# Patient Record
Sex: FEMALE | Race: WHITE | NOT HISPANIC OR LATINO | Employment: OTHER | URBAN - METROPOLITAN AREA
[De-identification: names, ages, dates, MRNs, and addresses within clinical notes are randomized per-mention and may not be internally consistent; named-entity substitution may affect disease eponyms.]

---

## 2017-01-16 ENCOUNTER — TELEPHONE (OUTPATIENT)
Dept: HEPATOLOGY | Facility: CLINIC | Age: 62
End: 2017-01-16

## 2017-01-16 NOTE — TELEPHONE ENCOUNTER
Reviewed u/s report 12/22/16:  Inform pt: enlarged liver , spleen, consistent with cirrhosis. No tumor in the liver.     Continue AFP and U/S abd q 6 months.

## 2017-01-26 ENCOUNTER — OFFICE VISIT (OUTPATIENT)
Dept: HEPATOLOGY | Facility: CLINIC | Age: 62
End: 2017-01-26
Payer: MEDICARE

## 2017-01-26 VITALS
SYSTOLIC BLOOD PRESSURE: 165 MMHG | RESPIRATION RATE: 18 BRPM | DIASTOLIC BLOOD PRESSURE: 65 MMHG | WEIGHT: 210 LBS | BODY MASS INDEX: 42.33 KG/M2 | HEART RATE: 79 BPM | HEIGHT: 59 IN

## 2017-01-26 DIAGNOSIS — K74.60 CIRRHOSIS OF LIVER WITH ASCITES, UNSPECIFIED HEPATIC CIRRHOSIS TYPE: Primary | ICD-10-CM

## 2017-01-26 DIAGNOSIS — R18.8 CIRRHOSIS OF LIVER WITH ASCITES, UNSPECIFIED HEPATIC CIRRHOSIS TYPE: Primary | ICD-10-CM

## 2017-01-26 DIAGNOSIS — R10.12 LEFT UPPER QUADRANT PAIN: ICD-10-CM

## 2017-01-26 PROCEDURE — 99215 OFFICE O/P EST HI 40 MIN: CPT | Mod: S$GLB,,, | Performed by: INTERNAL MEDICINE

## 2017-01-26 RX ORDER — NATEGLINIDE 120 MG/1
120 TABLET ORAL
COMMUNITY

## 2017-01-26 RX ORDER — HYDROCODONE BITARTRATE AND ACETAMINOPHEN 7.5; 325 MG/1; MG/1
1 TABLET ORAL EVERY 6 HOURS PRN
COMMUNITY

## 2017-01-26 RX ORDER — BISACODYL 5 MG
5 TABLET, DELAYED RELEASE (ENTERIC COATED) ORAL DAILY PRN
COMMUNITY

## 2017-01-26 RX ORDER — PRAVASTATIN SODIUM 40 MG/1
40 TABLET ORAL DAILY
COMMUNITY

## 2017-01-26 NOTE — PROGRESS NOTES
[facility] follow-up Note    Reason for Consult:  The primary encounter diagnosis was Cirrhosis of liver with ascites, unspecified hepatic cirrhosis type. A diagnosis of Left upper quadrant pain was also pertinent to this visit.    PCP: Primary Doctor No       HPI:  This is a 61 y.o. female here for follow-up of:  Cirrhosis    This is a 61-year-old  female with cirrhosis probably due to MENDIOLA, who is here for her routine follow-up however today she reports having left upper quadrant abdominal pain for nearly 3 months.  She was seen in the emergency room on 29 December 2016, when an abdominal CT scan was performed and that showed small amount of ascites.  Patient states that her abdominal pain has worsened since her ER visit and now is associated with marked tenderness in the left upper quadrant.  She denies fever, nausea, constipation, diarrhea, hematochezia, melena.  Her latest ultrasound of the abdomen which was performed as part of HCC surveillance on December 22, 2016 showed hepatosplenomegaly similar to prior exams in July 2016, portal vein was patent flow in the appropriate direction.  At that time no ascites was mentioned on the ultrasound.    Past history at presentation in July 2016:  This 61-year-old  female who had right upper quadrant pain for over a year, when she had an upper endoscopy to evaluate her pain and was found to have grade 1 varix on her EGD on December 16, 2015.  An ultrasound of the abdomen on January 29, 2016 showed hepatomegaly and a nodular liver consistent with cirrhosis. No focal mass was seen in the liver. A CT scan in January 2015 had shown a fatty liver, no mention was made of the size of the spleen. A 6-year-old CT from December 14, 2010 showed liver spleen pancreas and adrenal glands and kidneys were normal.    She also had anemia from frequent hemorrhoidal bleeds and at one time required a unit of packed red blood cells and iron infusions for  severe anemia.  Previously 34 years ago when she was 6 months pregnant, she had her open cholecystectomy.    Her last set of follow-up blood test from emergency room visit on 2016 continue to show anemia with a hemoglobin of 9.6 her platelet count was normal at 249 her liver chemistries were essentially normal with alkaline phosphatase of 97 AST 20 ALT 9 total bilirubin 0.3 and an albumin of 2.9 her sodium was 135, potassium 3.4 and creatinine 0.8.  Urinalysis had trace leukocyte esterase and WBCs 0-3.  Her alpha-fetoprotein was 3.    ROS:  Constitutional: No fevers, chills, weight changes, fatigue  ENT: No allergies, nosebleeds,   CV: No chest pain  Pulm: No cough, shortness of breath  Ophtho: No vision changes  GI/Liver: see HPI, abd pain as above.   Derm: No rash, itching  Heme: No swollen glands, bruising  MSK: No joint pains, joint swelling  : No dysuria, hematuria, decrease in urine output  Endo: No hot or cold intolerance  Neuro: No confusion, disorientation, difficulty with sleep, memory, concentration, syncope, seizure  Psych: No anxiety, depression    Medical History:  has a past medical history of Anemia; Cirrhosis; Colon polyp; COPD (chronic obstructive pulmonary disease); Diabetes mellitus; Diverticulosis; Gastric ulcer; Hypertension; Hypothyroid; and Sleep apnea.    Surgical History:  has a past surgical history that includes Cholecystectomy;  section; Hysterectomy; and Thyroidectomy.    Family History: family history includes Colon cancer in her sister; Colon polyps in her father; Endometrial cancer in her mother; Ovarian cancer in her mother..     Social History:  reports that she has never smoked. She does not have any smokeless tobacco history on file. She reports that she does not drink alcohol or use illicit drugs.    Review of patient's allergies indicates:   Allergen Reactions    Tylox [oxycodone-acetaminophen]        Current Outpatient Prescriptions   Medication Sig     "bisacodyl (DULCOLAX) 5 mg EC tablet Take 5 mg by mouth daily as needed for Constipation.    calcium-vitamin D3 500 mg(1,250mg) -200 unit per tablet Take 1 tablet by mouth once daily.    carvedilol (COREG) 6.25 MG tablet Take 1 tablet (6.25 mg total) by mouth 2 (two) times daily with meals.    fenofibrate (TRICOR) 145 MG tablet Take 145 mg by mouth once daily.    furosemide (LASIX) 20 MG tablet Take 1 tablet (20 mg total) by mouth once daily.    hydrocodone-acetaminophen 7.5-325mg (NORCO) 7.5-325 mg per tablet Take 1 tablet by mouth every 6 (six) hours as needed for Pain.    levothyroxine (SYNTHROID) 150 MCG tablet Take 150 mcg by mouth once daily.    nateglinide (STARLIX) 120 MG tablet Take 120 mg by mouth 3 (three) times daily before meals.    omeprazole (PRILOSEC) 40 MG capsule Take 40 mg by mouth every morning.    pravastatin (PRAVACHOL) 40 MG tablet Take 40 mg by mouth once daily.    sitagliptan-metformin (JANUMET) 50-1,000 mg per tablet Take 1 tablet by mouth 2 (two) times daily with meals.    telmisartan (MICARDIS) 80 MG Tab Take 40 mg by mouth once daily.    venlafaxine (EFFEXOR-XR) 150 MG Cp24 Take 75 mg by mouth once daily.    zolpidem (AMBIEN) 10 mg Tab Take 5 mg by mouth nightly as needed.    albuterol (PROVENTIL) 5 mg/mL nebulizer solution Take 2.5 mg by nebulization every 6 (six) hours as needed for Wheezing.    docusate sodium (COLACE) 100 MG capsule Take 100 mg by mouth daily as needed for Constipation.     No current facility-administered medications for this visit.        Objective Findings:    Vital Signs:  Visit Vitals    BP (!) 165/65    Pulse 79    Resp 18    Ht 4' 11" (1.499 m)    Wt 95.3 kg (210 lb)    BMI 42.41 kg/m2     Body mass index is 42.41 kg/(m^2).    Physical Exam:  General Appearance: Well appearing obese female in no acute distress  Head:   Normocephalic, without obvious abnormality  Eyes:    No scleral icterus, EOMI  ENT: Negative  Lungs:  no wheezes  Heart:  " Regular rate and rhythm, S1, S2 normal   Abdomen: Abdomen is distended and remarkably tender in the left upper quadrant with a palpable spleen.  There appears to be some ascites in the abdomen.  Extremities: no clubbing, cyanosis.  No edema in the lower extremities  Skin: No rash  Neurologic: CN II-XII intact, alert, oriented x 3. No asterixis      Labs:  As above    Imaging:   As above    Endoscopy:    Grade 1 varix.       Assessment:  1. Cirrhosis of liver with ascites, unspecified hepatic cirrhosis type    2. Left upper quadrant pain    Cirrhosis, most likely due to Dias, DM, poor control of blood glucose. mild portal HTN, based on grade 1 varix, normal plt ct.  splenomegaly however suggests significant portal hypertension.  CT scan of the abdomen did show some ascites which may have worsened over the last month.    At this time, patient needs evaluation in the emergency room with repeat ultrasound of the abdomen, possibly drainage of ascites and evaluate for peritonitis.  No peritonitis is found and renal function is normal she may need benefit from restarting diuretics (patient had stopped her Lasix because there was no lower extremity swelling).      Will need HCC surveillance.    Recommendations:  -  I have recommended the patient and her  go to the emergency room at the local hospital in [place] for an evaluation of left-sided abdominal pain, possibly spontaneous bacterial peritonitis.  None found and she said has significant ascites to be on diuretics as long as her renal function is normal.    For routine follow-up patient will need:  -  Labs     AFP, CMP, PT INR every 3 months,  -  Low salt in the diet, avoid canned, bottled and processed foods.  -  Continue current meds, except stop norvasc and toprol.    -  Continue carvedilol 6.25 mg BID, and Lasix 20 mg daily.   -  Patient advised to check her blood pressure daily and report if remains high.  -  Ultrasound of abdomen and AFP every 6 months,  next due July 2017   -  Avoid alcohol, smoking, sedatives and meds with codeine.  -  Endoscopy: per víctor [place]  -  Transplant option discussed, will evaluate when MELD >15.  -  Return in 6 months.       Return in about 6 months (around 7/26/2017).      Order summary:  No orders of the defined types were placed in this encounter.      Thank you so much for allowing me to participate in the care of Shelby Joshua Jordan MD

## 2017-01-26 NOTE — Clinical Note
For routine follow-up patient will need: -  Labs    AFP, CMP, PT INR every 3 months, -  Low salt in the diet, avoid canned, bottled and processed foods. -  Continue current meds, except stop norvasc and toprol.   -  Continue carvedilol 6.25 mg BID, and Lasix 20 mg daily.  -  Patient advised to check her blood pressure daily and report if remains high. -  Ultrasound of abdomen and AFP every 6 months, next due July 2017  -  Avoid alcohol, smoking, sedatives and meds with codeine. -  Endoscopy: per dr Finch [place] -  Transplant option discussed, will evaluate when MELD >15. -  Return in 6 months.

## 2017-01-26 NOTE — PATIENT INSTRUCTIONS
For routine follow-up patient will need:  -  Labs     AFP, CMP, PT INR every 3 months,  -  Low salt in the diet, avoid canned, bottled and processed foods.  -  Continue current meds, except stop norvasc and toprol.    -  Continue carvedilol 6.25 mg BID, and Lasix 20 mg daily.   -  Patient advised to check her blood pressure daily and report if remains high.  -  Ultrasound of abdomen and AFP every 6 months, next due July 2017   -  Avoid alcohol, smoking, sedatives and meds with codeine.  -  Endoscopy: per dr Finch [place]  -  Transplant option discussed, will evaluate when MELD >15.  -  Return in 6 months.

## 2017-02-22 ENCOUNTER — TELEPHONE (OUTPATIENT)
Dept: HEPATOLOGY | Facility: CLINIC | Age: 62
End: 2017-02-22

## 2017-02-22 NOTE — TELEPHONE ENCOUNTER
Received a call from the  of the patient, Eliud. Eliud stated that the patient has been in extreme pain.  Eliud asked if they had gone to see her PCP? He stated yes he had ordered something to go under her tongue (hyoscyamine) and Tramadol.  Eliud stated Dr Jordan wanted to order some test for her. Eliud informed that Shelby needs to be examined by a Dr if she is in extreme pain.  You need to take her to the nearest ER.   Eliud stated that the last time she saw Dr Jordan in [place] she sent her to [facility] ER. We have been to the ER 3 times.  Eliud instructed to take her to [facility] ER or come to [facility] ER. She needs to be checked.   I will send the message to Dr Jordan.

## 2017-04-05 ENCOUNTER — TELEPHONE (OUTPATIENT)
Dept: HEPATOLOGY | Facility: CLINIC | Age: 62
End: 2017-04-05

## 2017-04-05 NOTE — TELEPHONE ENCOUNTER
Spoke with pt's , he stated his wife is still having left abdominal pain. I asked if wife was seen PCP, he stated yes. Wife is going back for additional (x-ray). Mr. Lewis still requesting an appointment to see Dr. Jordan. Next available is 05/11/2017 @ 2pm. He accepted appt. Explained to  follow up with PCP or ER if symptoms  get worse. He verbalized understanding.    Lab Orders mailed to get done in April.     Told Nurse Jaida about patient,  Since last ABD U/S was in 12/2016 she suggest patient get scan done May before appt. Order mailed.

## 2017-05-04 LAB
AGE: 61
ALBUMIN: 3.1 (ref 3.4–4.8)
ALP SERPL-CCNC: 40 U/L (ref 25–125)
ALT SERPL W P-5'-P-CCNC: 9 U/L (ref 0–55)
ANION GAP SERPL CALC-SCNC: 9 MMOL/L (ref ?–30)
AST SERPL-CCNC: 20 U/L (ref 5–34)
BASOPHILS ABSOLUTE COUNT: 0 /ΜL
BASOPHILS NFR BLD: 0 % (ref 0–3)
BILIRUB SERPL-MCNC: 0.4 MG/DL (ref 0.1–1.4)
BUN BLD-MCNC: 8 MG/DL (ref 10–21)
CALCIUM SERPL-MCNC: 9.4 MG/DL (ref 8.4–10.7)
CHLORIDE SERPL-SCNC: 106 MMOL/L (ref 99–108)
CO2 SERPL-SCNC: 26 MMOL/L (ref 13–22)
CREAT SERPL-MCNC: 0.8 MG/DL (ref 0.5–1.1)
EGFR IF AFRICAN AMERICAN: 60
EOSINOPHIL NFR BLD: 2.1 %
EOSINOPHILS ABSOLUTE COUNT: 0 /ΜL
ERYTHROCYTE [DISTWIDTH] IN BLOOD BY AUTOMATED COUNT: 17.8 % (ref 11.5–14.5)
EST. GFR  (NON AFRICAN AMERICAN): 60 MG/DL
GFR MDRD AF AMER: 83
GFR: 69
GLUCOSE SERPL-MCNC: 140 MG/DL (ref 80–115)
HCT VFR BLD AUTO: 29 % (ref 33–46)
HGB BLD-MCNC: 9.2 G/DL (ref 11–15.7)
INR PPP: 1.1 (ref 0.9–1.2)
LYMPHOCYTES %: 15 % (ref 18–52)
LYMPHOCYTES ABSOLUTE COUNT: 1 /ΜL
MCH RBC QN AUTO: 22.9 PG (ref 26–34)
MCHC RBC AUTO-ENTMCNC: 32 G/DL (ref 32–35)
MCV RBC AUTO: 73 FL (ref 82–108)
MONOCYTES %: 5.8
MONOCYTES ABSOLUTE COUNT: 0.5
NEUTROPHILS ABSOLUTE COUNT: 6 /ΜL
NEUTROPHILS RELATIVE PERCENT: 77 % (ref 46–78)
PLATELET # BLD AUTO: 240 K/ΜL (ref 150–399)
POTASSIUM SERPL-SCNC: 3.1 MMOL/L (ref 3.4–5.3)
PROTHROMBIN TIME, POC: 12.9 (ref 9.8–13.2)
RBC # BLD AUTO: 3.99 10^6/ΜL (ref 3.8–5.4)
SODIUM BLD-SCNC: 141 MMOL/L (ref 137–147)
TOTAL PROTEIN: 8.1 G/DL (ref 6.4–8.2)
WBC # BLD AUTO: 8.1 10^3/ML (ref 4.1–10.6)

## 2017-05-05 ENCOUNTER — TELEPHONE (OUTPATIENT)
Dept: HEPATOLOGY | Facility: CLINIC | Age: 62
End: 2017-05-05

## 2017-05-05 DIAGNOSIS — E87.6 HYPOKALEMIA: Primary | ICD-10-CM

## 2017-05-05 NOTE — TELEPHONE ENCOUNTER
----- Message from Mary Jordan MD sent at 5/5/2017  1:15 PM CDT -----  Pl have patient increase foods/drinks with potassium, such as orange juice, oranges, potatoes, bananas.  She needs to have another BMP done in a week. Order placed.

## 2017-05-05 NOTE — TELEPHONE ENCOUNTER
MA tried to call patient she is unable to reached left her VM that we are going to mail her blood work order and she need to get this done Monday. ELÍAS

## 2017-05-08 LAB — AFP-TUMOR MARKER: 2

## 2017-05-09 ENCOUNTER — TELEPHONE (OUTPATIENT)
Dept: HEPATOLOGY | Facility: CLINIC | Age: 62
End: 2017-05-09

## 2017-05-09 NOTE — TELEPHONE ENCOUNTER
----- Message from Mary Jordan MD sent at 5/8/2017  9:50 PM CDT -----  Tumor marker normal, inform pt  .thx

## 2017-05-11 ENCOUNTER — OFFICE VISIT (OUTPATIENT)
Dept: HEPATOLOGY | Facility: CLINIC | Age: 62
End: 2017-05-11
Payer: MEDICARE

## 2017-05-11 VITALS
HEIGHT: 59 IN | WEIGHT: 185 LBS | SYSTOLIC BLOOD PRESSURE: 167 MMHG | DIASTOLIC BLOOD PRESSURE: 87 MMHG | HEART RATE: 76 BPM | BODY MASS INDEX: 37.29 KG/M2

## 2017-05-11 DIAGNOSIS — K74.60 CIRRHOSIS OF LIVER WITHOUT ASCITES, UNSPECIFIED HEPATIC CIRRHOSIS TYPE: Primary | ICD-10-CM

## 2017-05-11 PROCEDURE — 99214 OFFICE O/P EST MOD 30 MIN: CPT | Mod: S$GLB,,, | Performed by: INTERNAL MEDICINE

## 2017-05-11 RX ORDER — TOPIRAMATE SPINKLE 25 MG/1
50 CAPSULE ORAL 2 TIMES DAILY
COMMUNITY

## 2017-05-11 NOTE — Clinical Note
Plan: -  Labs    AFP, CMP, PT INR every 6 months, next in Novemebr 2017, -  Low salt in the diet, avoid canned, bottled and processed foods. -  Continue current meds, except stop norvasc and toprol.   -  Continue carvedilol 6.25 mg BID, and Lasix 20 mg daily.  -  Patient advised to check her blood pressure daily and report if remains high. -  Ultrasound of abdomen and AFP every 6 months, next due November 2017  -  Avoid alcohol, smoking, sedatives and meds with codeine. -  Endoscopy: per dr Finch [place] -  Transplant option discussed, will evaluate when MELD >15. -  Return in 6 months.

## 2017-05-11 NOTE — PATIENT INSTRUCTIONS
Plan:  -  Labs     AFP, CMP, PT INR every 6 months, next in Novemebr 2017,  -  Low salt in the diet, avoid canned, bottled and processed foods.  -  Continue current meds, except stop norvasc and toprol.    -  Continue carvedilol 6.25 mg BID, and Lasix 20 mg daily.   -  Patient advised to check her blood pressure daily and report if remains high.  -  Ultrasound of abdomen and AFP every 6 months, next due November 2017   -  Avoid alcohol, smoking, sedatives and meds with codeine.  -  Endoscopy: per dr Finch [place]  -  Transplant option discussed, will evaluate when MELD >15.  -  Return in 6 months.

## 2017-05-11 NOTE — PROGRESS NOTES
[facility] follow-up Note    Reason for Consult:  The encounter diagnosis was Cirrhosis of liver without ascites, unspecified hepatic cirrhosis type.    PCP: Primary Doctor No       HPI:  This is a 61 y.o. female here for follow-up of:  Cirrhosis    This is a 61-year-old  female with cirrhosis probably due to MENDIOLA, who is here for her routine follow-up however today she reports having left upper quadrant abdominal pain for nearly 3 months.  She was seen in the emergency room on 29 December 2016, when an abdominal CT scan was performed and that showed small amount of ascites.  Patient states that her abdominal pain has worsened since her ER visit and now is associated with marked tenderness in the left upper quadrant.  She denies fever, nausea, constipation, diarrhea, hematochezia, melena.  Her latest ultrasound of the abdomen which was performed as part of HCC surveillance on December 22, 2016 showed hepatosplenomegaly similar to prior exams in July 2016, portal vein was patent flow in the appropriate direction.  At that time no ascites was mentioned on the ultrasound.    Past history at presentation in July 2016:  This 61-year-old  female who had right upper quadrant pain for over a year, when she had an upper endoscopy to evaluate her pain and was found to have grade 1 varix on her EGD on December 16, 2015.  An ultrasound of the abdomen on January 29, 2016 showed hepatomegaly and a nodular liver consistent with cirrhosis. No focal mass was seen in the liver. A CT scan in January 2015 had shown a fatty liver, no mention was made of the size of the spleen. A 6-year-old CT from December 14, 2010 showed liver spleen pancreas and adrenal glands and kidneys were normal.    She also had anemia from frequent hemorrhoidal bleeds and at one time required a unit of packed red blood cells and iron infusions for severe anemia.  Previously 34 years ago when she was 6 months pregnant, she had  her open cholecystectomy.    Her last set of follow-up blood test from emergency room visit on 2016 continue to show anemia with a hemoglobin of 9.6 her platelet count was normal at 249 her liver chemistries were essentially normal with alkaline phosphatase of 97 AST 20 ALT 9 total bilirubin 0.3 and an albumin of 2.9 her sodium was 135, potassium 3.4 and creatinine 0.8.  Urinalysis had trace leukocyte esterase and WBCs 0-3.  Her alpha-fetoprotein was 3.    Since her last visit, had frequent falls (6 time in one week), saw pain doctor, Dr. Bonner, who sent her to Dr Bonner, she was diagnosed with polyneuropathy, was given Topiramate 50 mg TID.  This has stopped falling, lost 20 lb since last visit.  Continues to lose wt.     Labs from 17 are ok, except for anemia. MELD 7.    HCC surveillance: on 17, AFP 2, and u/s 17 showed HSmegaly, no focal lesion in the liver or ascites.      ROS:  Constitutional: No fevers, chills, weight changes, fatigue  ENT: No allergies, nosebleeds,   CV: No chest pain  Pulm: No cough, shortness of breath  Ophtho: No vision changes  GI/Liver: see HPI, abd pain as above.   Derm: No rash, itching  Heme: No swollen glands, bruising  MSK: No joint pains, joint swelling  : No dysuria, hematuria, decrease in urine output  Endo: No hot or cold intolerance  Neuro: No confusion, disorientation, difficulty with sleep, memory, concentration, syncope, seizure  Psych: No anxiety, depression    Medical History:  has a past medical history of Anemia; Cirrhosis; Colon polyp; COPD (chronic obstructive pulmonary disease); Diabetes mellitus; Diverticulosis; Gastric ulcer; Hypertension; Hypothyroid; and Sleep apnea.    Surgical History:  has a past surgical history that includes Cholecystectomy;  section; Hysterectomy; and Thyroidectomy.    Family History: family history includes Colon cancer in her sister; Colon polyps in her father; Endometrial cancer in her mother; Ovarian  cancer in her mother..     Social History:  reports that she has never smoked. She does not have any smokeless tobacco history on file. She reports that she does not drink alcohol or use illicit drugs.    Review of patient's allergies indicates:   Allergen Reactions    Tylox [oxycodone-acetaminophen]        Current Outpatient Prescriptions   Medication Sig    topiramate 25 mg capsule Take 50 mg by mouth 2 (two) times daily.    albuterol (PROVENTIL) 5 mg/mL nebulizer solution Take 2.5 mg by nebulization every 6 (six) hours as needed for Wheezing.    bisacodyl (DULCOLAX) 5 mg EC tablet Take 5 mg by mouth daily as needed for Constipation.    calcium-vitamin D3 500 mg(1,250mg) -200 unit per tablet Take 1 tablet by mouth once daily.    carvedilol (COREG) 6.25 MG tablet Take 1 tablet (6.25 mg total) by mouth 2 (two) times daily with meals.    docusate sodium (COLACE) 100 MG capsule Take 100 mg by mouth daily as needed for Constipation.    fenofibrate (TRICOR) 145 MG tablet Take 145 mg by mouth once daily.    furosemide (LASIX) 20 MG tablet Take 1 tablet (20 mg total) by mouth once daily.    hydrocodone-acetaminophen 7.5-325mg (NORCO) 7.5-325 mg per tablet Take 1 tablet by mouth every 6 (six) hours as needed for Pain.    levothyroxine (SYNTHROID) 150 MCG tablet Take 150 mcg by mouth once daily.    nateglinide (STARLIX) 120 MG tablet Take 120 mg by mouth 3 (three) times daily before meals.    omeprazole (PRILOSEC) 40 MG capsule Take 40 mg by mouth every morning.    pravastatin (PRAVACHOL) 40 MG tablet Take 40 mg by mouth once daily.    sitagliptan-metformin (JANUMET) 50-1,000 mg per tablet Take 1 tablet by mouth 2 (two) times daily with meals.    telmisartan (MICARDIS) 80 MG Tab Take 40 mg by mouth once daily.    venlafaxine (EFFEXOR-XR) 150 MG Cp24 Take 75 mg by mouth once daily.    zolpidem (AMBIEN) 10 mg Tab Take 5 mg by mouth nightly as needed.     No current facility-administered medications for this  "visit.        Objective Findings:    Vital Signs:  BP (!) 167/87  Pulse 76  Ht 4' 11" (1.499 m)  Wt 83.9 kg (185 lb)  BMI 37.37 kg/m2  Body mass index is 37.37 kg/(m^2).    Physical Exam:  General Appearance: Well appearing obese female in no acute distress  Head:   Normocephalic, without obvious abnormality  Eyes:    No scleral icterus, EOMI  ENT: Negative  Lungs:  no wheezes  Heart:  Regular rate and rhythm, S1, S2 normal   Abdomen: Abdomen is distended and remarkably tender in the left upper quadrant with a palpable spleen.  There appears to be some ascites in the abdomen.  Extremities: no clubbing, cyanosis.  No edema in the lower extremities  Skin: No rash  Neurologic: CN II-XII intact, alert, oriented x 3. No asterixis      Labs:  As above    Imaging:   As above    Endoscopy:    Grade 1 varix.       Assessment:  1. Cirrhosis of liver without ascites, unspecified hepatic cirrhosis type    Cirrhosis, most likely due to Dias, DM, poor control of blood glucose. mild portal HTN, based on grade 1 varix, normal plt ct.  splenomegaly however suggests significant portal hypertension.  CT scan of the abdomen did show some ascites which may have worsened over the last month.    At this time, patient needs evaluation in the emergency room with repeat ultrasound of the abdomen, possibly drainage of ascites and evaluate for peritonitis.  No peritonitis is found and renal function is normal she may need benefit from restarting diuretics (patient had stopped her Lasix because there was no lower extremity swelling).      Will need HCC surveillance.    Plan:  -  Labs     AFP, CMP, PT INR every 6 months, next in Novemebr 2017,  -  On topiramate for poluneuropathy  -  Low salt in the diet, avoid canned, bottled and processed foods.  -  Continue current meds, except stop norvasc and toprol.    -  Continue carvedilol 6.25 mg BID, and Lasix 20 mg daily.   -  Patient advised to check her blood pressure daily and report if remains " high.  -  Ultrasound of abdomen and AFP every 6 months, next due November 2017   -  Avoid alcohol, smoking, sedatives and meds with codeine.  -  Endoscopy: per víctor Stone [place]  -  Transplant option discussed, will evaluate when MELD >15.  -  Return in 6 months.       Return in about 6 months (around 11/11/2017).      Order summary:  No orders of the defined types were placed in this encounter.      Thank you so much for allowing me to participate in the care of Shelby Jordan MD

## 2017-08-14 DIAGNOSIS — K74.60 CIRRHOSIS OF LIVER WITHOUT ASCITES, UNSPECIFIED HEPATIC CIRRHOSIS TYPE: ICD-10-CM

## 2017-08-14 DIAGNOSIS — K76.6 PORTAL HYPERTENSION: ICD-10-CM

## 2017-08-15 RX ORDER — CARVEDILOL 6.25 MG/1
TABLET ORAL
Qty: 60 TABLET | Refills: 11 | Status: SHIPPED | OUTPATIENT
Start: 2017-08-15

## 2018-01-10 LAB
ALBUMIN: 3.2
ALP ISOS SERPL LEV INH-CCNC: 98 U/L
ALT SERPL-CCNC: 21 U/L
ANION GAP SERPL CALC-SCNC: 9 MMOL/L
AST SERPL-CCNC: 34 U/L
BASOPHILS ABSOLUTE COUNT: 0 /ΜL
BASOPHILS NFR BLD: 1 % (ref 0–3)
BILIRUBIN, TOTAL: 0.4
BUN BLD-MCNC: 9 MG/DL (ref 4–21)
CALCIUM SERPL-MCNC: 7.7 MG/DL
CARBON DIOXIDE, CO2: 26
CHLORIDE: 104
CREAT SERPL-MCNC: 0.8 MG/DL
EGFR IF AFRICAN AMERICAN: >60
EOSINOPHILS ABSOLUTE COUNT: 0 /ΜL
ERYTHROCYTE [DISTWIDTH] IN BLOOD BY AUTOMATED COUNT: 20.2 % (ref 11.5–14.5)
EST. GFR  (NON AFRICAN AMERICAN): NORMAL MG/DL
GFR MDRD AF AMER: 94
GFR MDRD NON AF AMER: 78
GLUCOSE: 142
HCT VFR BLD AUTO: 35 % (ref 36–46)
HGB BLD-MCNC: 10.8 G/DL (ref 12–16)
IMMUNOGLOBULIN G: 0.06
INR PPP: 1.1
LYMPH%: 15 % (ref 18–52)
LYMPHOCYTES ABSOLUTE COUNT: 1 /ΜL
MCH RBC QN AUTO: 27 PG
MCHC RBC AUTO-ENTMCNC: 30.5 G/DL
MCV RBC AUTO: 88.5 FL
MONOCYTES %: 6.5
MONOCYTES ABSOLUTE COUNT: 0.47
NEUTROPHILS ABSOLUTE COUNT: 5 /ΜL
NEUTROPHILS RELATIVE PERCENT: 74 % (ref 46–78)
PLATELET # BLD AUTO: 171 K/ΜL (ref 150–399)
POTASSIUM: 3.37
PROT SERPL-MCNC: 8.4 G/DL
PT: 12.7
RBC # BLD AUTO: 4 10^6/ΜL (ref 4–5.2)
SISOMICIN TITR SBT: 3 % (ref 0–6)
SODIUM: 139
WBC # BLD AUTO: 7.2 10^3/ML

## 2018-01-11 ENCOUNTER — TELEPHONE (OUTPATIENT)
Dept: HEPATOLOGY | Facility: CLINIC | Age: 63
End: 2018-01-11

## 2018-01-11 NOTE — TELEPHONE ENCOUNTER
----- Message from Mary Jordan MD sent at 1/10/2018  9:23 PM CST -----  Labs are consistent with cirrhosis.

## 2018-01-16 ENCOUNTER — TELEPHONE (OUTPATIENT)
Dept: HEPATOLOGY | Facility: CLINIC | Age: 63
End: 2018-01-16

## 2018-01-16 LAB — AFP: 2

## 2018-01-16 NOTE — TELEPHONE ENCOUNTER
----- Message from Mary Jordan MD sent at 1/16/2018  3:27 PM CST -----  Pl.  inform pt:  Liver cancer marker is normal.

## 2018-02-05 ENCOUNTER — TELEPHONE (OUTPATIENT)
Dept: HEPATOLOGY | Facility: CLINIC | Age: 63
End: 2018-02-05

## 2018-02-05 NOTE — TELEPHONE ENCOUNTER
Reviewed outside u/s abd 1/9/18:  Hepmegaly, nodular suggests cirrhosis. Marked splenomeg.      Inform pt:  Cirrhosis seen on u/s, do HCC surveiallance if not already started..  Send orders for u/s and AFP q 6 months. Next in July 2018

## 2018-02-06 NOTE — TELEPHONE ENCOUNTER
Mailed results note below and [facility]. Made recall in the system also for reminder. ELÍAS

## 2018-05-16 ENCOUNTER — TELEPHONE (OUTPATIENT)
Dept: HEPATOLOGY | Facility: CLINIC | Age: 63
End: 2018-05-16

## 2018-05-16 NOTE — TELEPHONE ENCOUNTER
----- Message from Neema Wang sent at 5/16/2018  3:06 PM CDT -----  Contact: pt      ----- Message -----  From: Janette Rodriguez  Sent: 5/16/2018   9:22 AM  To: Hepatology Scheduling    Patient Requesting Sooner Appointment.     Reason: (I.e. Caller declined first available, appointment listed below. Caller will not accept being placed on the waitlist and is requesting a message be sent to doctor, etc.)    Name of Caller: pt  When is the first available appointment?  n/a  Symptoms:  n/a  Communication Preference: [phone]  Additional Information: pt needs to schedule appt with Dr. Jordan in [place], [place] in July.

## 2018-05-16 NOTE — TELEPHONE ENCOUNTER
MA attempted to call patient back she is unable to reached left her VM to please give us a callback.

## 2018-06-01 ENCOUNTER — TELEPHONE (OUTPATIENT)
Dept: HEPATOLOGY | Facility: CLINIC | Age: 63
End: 2018-06-01

## 2018-06-01 NOTE — TELEPHONE ENCOUNTER
----- Message from Ozzie Sahu sent at 6/1/2018  9:59 AM CDT -----  Contact: patient   Patient received a letter to schedule an appointment for Reza Lim          Please call [phone]      Thanks!

## 2018-06-06 ENCOUNTER — TELEPHONE (OUTPATIENT)
Dept: HEPATOLOGY | Facility: CLINIC | Age: 63
End: 2018-06-06

## 2018-06-06 NOTE — TELEPHONE ENCOUNTER
MA called patient back, schedule her follow up visit in [place] on 7/12/18 she will do her labs and US before the visit. Patient stated that she already have her paper order for the test. Mailed appt reminder to patient.

## 2018-06-06 NOTE — TELEPHONE ENCOUNTER
----- Message from Mary Vazquez sent at 6/4/2018 11:00 AM CDT -----  Contact: pt  Patient Requesting Sooner Appointment.     Reason for sooner appt.:  When is the first available appointment?  Communication Preference: [phone]  Additional Information:wants to sched appt in July w/Dr. Jordan

## 2018-06-29 LAB
AGE: 62
AGE: 62
ALBUMIN: 2.9
ALBUMIN: 2.9
ALP SERPL-CCNC: 89 U/L
ALP SERPL-CCNC: 89 U/L (ref 25–125)
ALT SERPL W P-5'-P-CCNC: 11 U/L (ref 7–35)
ALT SERPL W P-5'-P-CCNC: 11 U/L (ref 7–35)
ANION GAP SERPL CALC-SCNC: 10 MMOL/L (ref 5–13)
ANION GAP SERPL CALC-SCNC: 10 MMOL/L (ref ?–30)
AST SERPL-CCNC: 24 U/L (ref 13–35)
AST SERPL-CCNC: 24 U/L (ref 13–35)
BASOPHILS NFR BLD: 0 % (ref 0–1)
BASOPHILS NFR BLD: 0.02 % (ref 0.01–0.08)
BUN BLD-MCNC: 9 MG/DL (ref 10–20)
BUN/CREAT SERPL: 8.5
CALCIUM SERPL-MCNC: 8.9 MG/DL
CALCIUM SERPL-MCNC: 8.9 MG/DL (ref 8.7–10.7)
CHLORIDE SERPL-SCNC: 107 MMOL/L (ref 98–107)
CHLORIDE SERPL-SCNC: 107 MMOL/L (ref 99–108)
CO2 SERPL-SCNC: 25 MMOL/L (ref 13–22)
CO2 SERPL-SCNC: 25 MMOL/L (ref 22–32)
CREAT SERPL-MCNC: 0.8 MG/DL (ref 0.5–1.1)
CREAT SERPL-MCNC: 0.8 MG/DL (ref 0.5–1.1)
EGFR IF AFRICAN AMERICAN: 60
EOSINOPHIL NFR BLD: 0.24 % (ref 0.04–0.36)
EOSINOPHIL NFR BLD: 3 % (ref 0.7–5.8)
ERYTHROCYTE [DISTWIDTH] IN BLOOD BY AUTOMATED COUNT: 14.8 % (ref 11.7–14.4)
EST. GFR  (NON AFRICAN AMERICAN): 60 MG/DL
EST. GFR  (NON AFRICAN AMERICAN): 60 MG/DL
GFR MDRD AF AMER: 85
GFR MDRD NON AF AMER: 85
GFR: 70
GFR: 70
GLUCOSE SERPL-MCNC: 152 MG/DL
GLUCOSE SERPL-MCNC: 152 MG/DL (ref 80–115)
HCT VFR BLD AUTO: 30 % (ref 34–45)
HGB BLD-MCNC: 8.7 G/DL (ref 11.2–15.7)
IGM: 0.9 (ref 0–0.4)
INR PPP: 1.1 (ref 0.9–1.1)
LYMPH #: 0.96 (ref 1.18–3.74)
LYMPH%: 12 % (ref 19–52)
Lab: 44.3 (ref 36.4–46.3)
Lab: 60
MCH RBC QN AUTO: 24 PG (ref 25.6–32.2)
MCHC RBC AUTO-ENTMCNC: 29 G/DL (ref 32–36)
MCV RBC AUTO: 83.7 FL (ref 79.4–94.8)
MONO #: 0.51 (ref 0.24–0.86)
MONO%: 6 % (ref 5–13)
NEUTROPHILS NFR BLD: 6.11 % (ref 1.56–6.13)
NEUTROPHILS NFR BLD: 77.3 % (ref 34–71.1)
NUCLEATED RED BLOOD CELLS: 0 /100 (ref 0–0)
PLATELET # BLD AUTO: 174 K/ΜL (ref 140–369)
POTASSIUM SERPL-SCNC: 3.3 MMOL/L (ref 3.3–5.1)
POTASSIUM SERPL-SCNC: 3.3 MMOL/L (ref 3.4–5.3)
PT: 13.1
RBC # BLD AUTO: 3.63 10*6/UL (ref 3.93–5.22)
SODIUM BLD-SCNC: 142 MMOL/L (ref 136–145)
SODIUM BLD-SCNC: 142 MMOL/L (ref 137–147)
TOTAL BILIRUBIN POC: 0.2
TOTAL BILIRUBIN POC: 0.2
TOTAL PROTEIN: 7.8 G/DL (ref 6.4–8.2)
TOTAL PROTEIN: 7.8 G/DL (ref 6.4–8.2)
WBC # BLD: 7.91 10*3/UL (ref 3.98–10.04)

## 2018-06-30 LAB
INR PPP: 0 (ref 2–3)
INR PPP: 1.1 (ref 0.9–1.1)
PT: 13.1

## 2018-07-02 ENCOUNTER — TELEPHONE (OUTPATIENT)
Dept: HEPATOLOGY | Facility: CLINIC | Age: 63
End: 2018-07-02

## 2018-07-02 NOTE — TELEPHONE ENCOUNTER
----- Message from Mary Jordan MD sent at 6/29/2018  5:57 PM CDT -----  Anemia, needs to see PCP.  Otherwise ok blood counts.

## 2018-07-03 ENCOUNTER — TELEPHONE (OUTPATIENT)
Dept: HEPATOLOGY | Facility: CLINIC | Age: 63
End: 2018-07-03

## 2018-07-03 NOTE — TELEPHONE ENCOUNTER
----- Message from Mary Jordan MD sent at 7/2/2018  9:54 PM CDT -----  Potassium little low, drink orange juice, eat bananas.

## 2018-07-03 NOTE — TELEPHONE ENCOUNTER
----- Message from Mary Jordan MD sent at 7/2/2018  9:47 PM CDT -----  Liver chemistries are OK.  Pl inform pt.

## 2018-07-12 ENCOUNTER — OFFICE VISIT (OUTPATIENT)
Dept: HEPATOLOGY | Facility: CLINIC | Age: 63
End: 2018-07-12
Payer: MEDICARE

## 2018-07-12 VITALS
BODY MASS INDEX: 39.92 KG/M2 | WEIGHT: 198 LBS | SYSTOLIC BLOOD PRESSURE: 161 MMHG | RESPIRATION RATE: 20 BRPM | DIASTOLIC BLOOD PRESSURE: 81 MMHG | HEART RATE: 76 BPM | HEIGHT: 59 IN

## 2018-07-12 DIAGNOSIS — K74.69 OTHER CIRRHOSIS OF LIVER: Primary | ICD-10-CM

## 2018-07-12 PROCEDURE — 99215 OFFICE O/P EST HI 40 MIN: CPT | Mod: S$GLB,,, | Performed by: INTERNAL MEDICINE

## 2018-07-12 RX ORDER — TRAMADOL HYDROCHLORIDE 100 MG/1
100 TABLET, EXTENDED RELEASE ORAL DAILY
COMMUNITY

## 2018-07-12 RX ORDER — LOSARTAN POTASSIUM 100 MG/1
100 TABLET ORAL DAILY
COMMUNITY

## 2018-07-12 RX ORDER — GABAPENTIN 100 MG/1
100 CAPSULE ORAL 3 TIMES DAILY
COMMUNITY

## 2018-07-12 RX ORDER — GLIMEPIRIDE 4 MG/1
4 TABLET ORAL
COMMUNITY

## 2018-07-12 NOTE — Clinical Note
Plan: -  Labs    AFP, CMP, PT INR every 6 months, next in Jan 2019, -  On topiramate for poluneuropathy -  Low salt in the diet, avoid canned, bottled and processed foods. -  Continue current meds, except stop norvasc and toprol.   -  Continue carvedilol 6.25 mg BID, and Lasix 20 mg daily.  -  Patient advised to check her blood pressure daily and report if remains high. -  Ultrasound of abdomen and AFP every 6 months, next due Jan 2019. -  Avoid alcohol, smoking, sedatives and meds with codeine. -  Endoscopy: per dr iFnch [place] -  Transplant option discussed, will evaluate when MELD >15. -  Return in 6 months.

## 2018-07-12 NOTE — PROGRESS NOTES
[facility] follow-up Note    Reason for Consult:  The encounter diagnosis was Other cirrhosis of liver.    PCP: Primary Doctor No       HPI:  This is a 62 y.o. female here for follow-up of:  Cirrhosis    This is a 61-year-old  female with cirrhosis probably due to MENDIOLA, who is here for her routine follow-up.  At her last visit, she had abd pain, with ascites form holding diuretics.  She was sent to the ER.  Currently she is on lasix 20 daily.       Patient denies fever, nausea, constipation, diarrhea, hematochezia, melena.      HCC surveillance:   Her latest ultrasound of the abdomen which was performed as part of HCC surveillance on 6/29/18 showed heterogenous liver parenchyma, nodular contour, similar to prior exams, portal vein was patent flow in the appropriate direction.  At that time no ascites was mentioned on the ultrasound. Last AFP was 2, on 6/29/18.      Past history at presentation in July 2016:  This 61-year-old  female who had right upper quadrant pain for over a year, when she had an upper endoscopy to evaluate her pain and was found to have grade 1 varix on her EGD on December 16, 2015.  An ultrasound of the abdomen on January 29, 2016 showed hepatomegaly and a nodular liver consistent with cirrhosis. No focal mass was seen in the liver. A CT scan in January 2015 had shown a fatty liver, no mention was made of the size of the spleen. A 6-year-old CT from December 14, 2010 showed liver spleen pancreas and adrenal glands and kidneys were normal.    She also had anemia from frequent hemorrhoidal bleeds and at one time required a unit of packed red blood cells and iron infusions for severe anemia.  Previously 34 years ago when she was 6 months pregnant, she had her open cholecystectomy.    Her last set of follow-up blood test from emergency room visit on December 29, 2016 continue to show anemia with a hemoglobin of 9.6 her platelet count was normal at 249 her liver  chemistries were essentially normal with alkaline phosphatase of 97 AST 20 ALT 9 total bilirubin 0.3 and an albumin of 2.9 her sodium was 135, potassium 3.4 and creatinine 0.8.  Urinalysis had trace leukocyte esterase and WBCs 0-3.  Her alpha-fetoprotein was 3.    Since her last visit, had frequent falls (6 time in one week), saw pain doctor, Dr. Bonner, who sent her to Dr Bonner, she was diagnosed with polyneuropathy, was given Topiramate 50 mg TID.  This has stopped falling, lost 20 lb since last visit.  Continues to lose wt.     Labs from 17 are ok, except for anemia. MELD 7.    HCC surveillance: on 17, AFP 2, and u/s 17 showed HSmegaly, no focal lesion in the liver or ascites.      ROS:  Constitutional: No fevers, chills, weight changes, fatigue  ENT: No allergies, nosebleeds,   CV: No chest pain  Pulm: No cough, shortness of breath  Ophtho: No vision changes  GI/Liver: see HPI, no abd pain, does report abd is swollen, but she holds the abd wall fat in her hands and claims that is the swelling she is referring to as swelling.    Derm: No rash, itching  Heme: No swollen glands, bruising  MSK: No joint pains, joint swelling  : No dysuria, hematuria, decrease in urine output  Endo: No hot or cold intolerance  Neuro: No confusion, disorientation, difficulty with sleep, memory, concentration, syncope, seizure  Psych: No anxiety, depression    Medical History:  has a past medical history of Anemia; Cirrhosis; Colon polyp; COPD (chronic obstructive pulmonary disease); Diabetes mellitus; Diverticulosis; Gastric ulcer; Hypertension; Hypothyroid; and Sleep apnea.    Surgical History:  has a past surgical history that includes Cholecystectomy;  section; Hysterectomy; and Thyroidectomy.    Family History: family history includes Colon cancer in her sister; Colon polyps in her father; Endometrial cancer in her mother; Ovarian cancer in her mother..     Social History:  reports that she has never smoked.  She does not have any smokeless tobacco history on file. She reports that she does not drink alcohol or use drugs.    Review of patient's allergies indicates:   Allergen Reactions    Tylox [oxycodone-acetaminophen]        Current Outpatient Prescriptions   Medication Sig    albuterol (PROVENTIL) 5 mg/mL nebulizer solution Take 2.5 mg by nebulization every 6 (six) hours as needed for Wheezing.    bisacodyl (DULCOLAX) 5 mg EC tablet Take 5 mg by mouth daily as needed for Constipation.    calcium-vitamin D3 500 mg(1,250mg) -200 unit per tablet Take 1 tablet by mouth once daily.    carvedilol (COREG) 6.25 MG tablet TAKE ONE TABLET BY MOUTH TWICE DAILY WITH MEALS    docusate sodium (COLACE) 100 MG capsule Take 100 mg by mouth daily as needed for Constipation.    gabapentin (NEURONTIN) 100 MG capsule Take 100 mg by mouth 3 (three) times daily.    glimepiride (AMARYL) 4 MG tablet Take 4 mg by mouth before breakfast.    levothyroxine (SYNTHROID) 150 MCG tablet Take 150 mcg by mouth once daily.    losartan (COZAAR) 100 MG tablet Take 100 mg by mouth once daily.    omeprazole (PRILOSEC) 40 MG capsule Take 40 mg by mouth every morning.    pravastatin (PRAVACHOL) 40 MG tablet Take 40 mg by mouth once daily.    sitagliptan-metformin (JANUMET) 50-1,000 mg per tablet Take 1 tablet by mouth 2 (two) times daily with meals.    topiramate 25 mg capsule Take 50 mg by mouth 2 (two) times daily.    traMADol (ULTRAM-ER) 100 MG Tb24 Take 100 mg by mouth once daily.    venlafaxine (EFFEXOR-XR) 150 MG Cp24 Take 75 mg by mouth once daily.    zolpidem (AMBIEN) 10 mg Tab Take 5 mg by mouth nightly as needed.    fenofibrate (TRICOR) 145 MG tablet Take 145 mg by mouth once daily.    furosemide (LASIX) 20 MG tablet Take 1 tablet (20 mg total) by mouth once daily.    hydrocodone-acetaminophen 7.5-325mg (NORCO) 7.5-325 mg per tablet Take 1 tablet by mouth every 6 (six) hours as needed for Pain.    nateglinide (STARLIX) 120 MG  "tablet Take 120 mg by mouth 3 (three) times daily before meals.    telmisartan (MICARDIS) 80 MG Tab Take 40 mg by mouth once daily.     No current facility-administered medications for this visit.        Objective Findings:    Vital Signs:  BP (!) 161/81   Pulse 76   Resp 20   Ht 4' 11" (1.499 m)   Wt 89.8 kg (198 lb)   BMI 39.99 kg/m²   Body mass index is 39.99 kg/m².    Physical Exam:  General Appearance: Well appearing obese female in no acute distress  Head:   Normocephalic, without obvious abnormality  Eyes:    No scleral icterus, EOMI  ENT: Negative  Lungs:  no wheezes  Heart:  Regular rate and rhythm, S1, S2 normal   Abdomen: Abdomen is not distended and non-tender.    Extremities: no clubbing, cyanosis.  No edema in the lower extremities  Skin: No rash  Neurologic: CN II-XII intact, alert, oriented x 3. No asterixis      Labs:  As above    Imaging:   As above    Endoscopy:    Grade 1 varix.       Assessment:  1. Other cirrhosis of liver    Cirrhosis, most likely due to MENDIOLA, DM, poor control of blood glucose. mild portal HTN, based on grade 1 varix, normal plt ct.  splenomegaly however suggests significant portal hypertension.  CT scan of the abdomen did show some ascites in the past, but last u/s on 6/29/18 does not mention any perihepatic ascites.    HCC surveillance neg to date 6/29/18 with u/s and AFP showing no evid of focal liver lesion or elev of AFP. .    Plan:  -  Labs     AFP, CMP, PT INR every 6 months, next in Jan 2019,  -  On topiramate for poluneuropathy  -  Low salt in the diet, avoid canned, bottled and processed foods.  -  Continue current meds, except stop norvasc and toprol.    -  Continue carvedilol 6.25 mg BID, and Lasix 20 mg daily.   -  Patient advised to check her blood pressure daily and report if remains high.  -  Ultrasound of abdomen and AFP every 6 months, next due Jan 2019.  -  Avoid alcohol, smoking, sedatives and meds with codeine.  -  Endoscopy: per dr Finch, new " [place]  -  Transplant option discussed, will evaluate when MELD >15.  -  Return in 6 months.       Follow-up in about 6 months (around 1/12/2019).      Order summary:  No orders of the defined types were placed in this encounter.      Thank you so much for allowing me to participate in the care of Shelby Jordan MD

## 2018-07-12 NOTE — PATIENT INSTRUCTIONS
Plan:  -  Labs     AFP, CMP, PT INR every 6 months, next in Jan 2019,  -  On topiramate for poluneuropathy  -  Low salt in the diet, avoid canned, bottled and processed foods.  -  Continue current meds, except stop norvasc and toprol.    -  Continue carvedilol 6.25 mg BID, and Lasix 20 mg daily.   -  Patient advised to check her blood pressure daily and report if remains high.  -  Ultrasound of abdomen and AFP every 6 months, next due Jan 2019.  -  Avoid alcohol, smoking, sedatives and meds with codeine.  -  Endoscopy: per dr Finch [place]  -  Transplant option discussed, will evaluate when MELD >15.  -  Return in 6 months.

## 2018-07-15 ENCOUNTER — TELEPHONE (OUTPATIENT)
Dept: HEPATOLOGY | Facility: CLINIC | Age: 63
End: 2018-07-15

## 2019-03-04 LAB
AFP-TUMOR MARKER: 2
AGE: 63
ALBUMIN: 3.1
ALK PHOS ISOENZYNMES: 80
ALT SERPL-CCNC: 15 U/L
ANION GAP SERPL CALC-SCNC: 7 MMOL/L
AST SERPL-CCNC: 23 U/L
BASOPHILS NFR BLD: 0.04 %
BASOPHILS NFR BLD: 1 % (ref 0–3)
BUN BLD-MCNC: 7 MG/DL (ref 4–21)
CALCIUM SERPL-MCNC: 8 MG/DL
CHLORIDE: 108
CO2 SERPL-SCNC: 24 MMOL/L
CREAT SERPL-MCNC: 0.8 MG/DL
EGFR IF AFRICAN AMERICAN: >60
EOSINOPHIL NFR BLD: 0.23 %
EOSINOPHIL NFR BLD: 3 %
ERYTHROCYTE [DISTWIDTH] IN BLOOD BY AUTOMATED COUNT: 18.6 %
ERYTHROCYTE [DISTWIDTH] IN BLOOD BY AUTOMATED COUNT: 61.1 %
EST. GFR  (NON AFRICAN AMERICAN): 60 MG/DL
GFR MDRD AF AMER: 86
GFR: 71
GLUCOSE: 189
HCT VFR BLD AUTO: 36 % (ref 36–46)
HGB BLD-MCNC: 11.4 G/DL (ref 12–16)
IMMATURE GRANULOCYTES: 0.06
IMMATURE GRANULOCYTES: 0.8
INR PPP: 1.1 (ref 2–3)
LYMPH #: 1.2
LYMPH%: 16 % (ref 18–52)
MCH RBC QN AUTO: 28.4 PG
MCHC RBC AUTO-ENTMCNC: 31.8 G/DL
MCV RBC AUTO: 89.5 FL
MONO #: 0.46
MONO%: 6 % (ref 3–10)
NEUTROPHILS NFR BLD: 5.66 %
NEUTROPHILS RELATIVE PERCENT: 74 % (ref 46–78)
NRBC: 0
NRBC: 0
PLATELETS: 141
POTASSIUM: 4.01
PT: 12.4
RBC # BLD AUTO: 4.01 10*6/UL
SODIUM: 139
TOTAL BILIRUBIN POC: 0.3
TOTAL PROTEIN: 7.9 G/DL (ref 6.4–8.2)
WBC: 7.65

## 2019-03-06 ENCOUNTER — TELEPHONE (OUTPATIENT)
Dept: HEPATOLOGY | Facility: CLINIC | Age: 64
End: 2019-03-06

## 2019-03-06 NOTE — TELEPHONE ENCOUNTER
----- Message from Mary Jordan MD sent at 3/5/2019  4:02 PM CST -----  Please inform patient results are OK.

## 2019-04-10 ENCOUNTER — TELEPHONE (OUTPATIENT)
Dept: HEPATOLOGY | Facility: CLINIC | Age: 64
End: 2019-04-10

## 2019-04-10 NOTE — TELEPHONE ENCOUNTER
MA called patient to inform her that MD is sick she is not able to make the appt in [place] tomorrow 4/11. We have reschedule her appt 5/23.     She is unable to reached left her DETAILED VM about the change on her schedule.     Mailed new appt reminder to patient.

## 2019-04-19 ENCOUNTER — TELEPHONE (OUTPATIENT)
Dept: HEPATOLOGY | Facility: CLINIC | Age: 64
End: 2019-04-19

## 2019-05-23 ENCOUNTER — OFFICE VISIT (OUTPATIENT)
Dept: HEPATOLOGY | Facility: CLINIC | Age: 64
End: 2019-05-23
Payer: MEDICARE

## 2019-05-23 VITALS
DIASTOLIC BLOOD PRESSURE: 81 MMHG | HEART RATE: 70 BPM | RESPIRATION RATE: 20 BRPM | HEIGHT: 59 IN | WEIGHT: 206 LBS | BODY MASS INDEX: 41.53 KG/M2 | SYSTOLIC BLOOD PRESSURE: 155 MMHG

## 2019-05-23 DIAGNOSIS — K74.69 OTHER CIRRHOSIS OF LIVER: Primary | ICD-10-CM

## 2019-05-23 PROCEDURE — 99214 PR OFFICE/OUTPT VISIT, EST, LEVL IV, 30-39 MIN: ICD-10-PCS | Mod: S$GLB,,, | Performed by: INTERNAL MEDICINE

## 2019-05-23 PROCEDURE — 99214 OFFICE O/P EST MOD 30 MIN: CPT | Mod: S$GLB,,, | Performed by: INTERNAL MEDICINE

## 2019-05-23 NOTE — Clinical Note
Plan:-  Patient advised to see Dr. Micah Finch for symptoms of chronic nausea, bilateral flank pains.  -  Labs   AFP, CMP, PT INR every 6 months, next in Aug  2019,-  Low salt in the diet, avoid canned, bottled and processed foods-  Continue carvedilol 6.25 mg BID, and Lasix 20 mg daily. -  Patient advised to check her blood pressure daily and report if remains high.-  Ultrasound of abdomen and AFP every 6 months, next due Aug 2019.-  Avoid alcohol, smoking, sedatives and meds with codeine.-  Endoscopy: per dr Finch [place]-  Transplant option discussed, will evaluate when MELD >15.-  Return in 1 year.

## 2019-05-23 NOTE — PATIENT INSTRUCTIONS
Plan:  -  Patient advised to see Dr. Micah Finch for symptoms of chronic nausea, bilateral flank pains.    -  Labs     AFP, CMP, PT INR every 6 months, next in Aug  2019,  -  Low salt in the diet, avoid canned, bottled and processed foods  -  Continue carvedilol 6.25 mg BID, and Lasix 20 mg daily.   -  Patient advised to check her blood pressure daily and report if remains high.  -  Ultrasound of abdomen and AFP every 6 months, next due Aug 2019.  -  Avoid alcohol, smoking, sedatives and meds with codeine.  -  Endoscopy: per dr Finch [place]  -  Transplant option discussed, will evaluate when MELD >15.  -  Return in 1 year.

## 2019-05-23 NOTE — PROGRESS NOTES
[facility] follow-up Note    Reason for Visit:  The encounter diagnosis was Other cirrhosis of liver.    PCP: Primary Doctor No       HPI:  This is a 63 y.o. female here for follow-up of:  Cirrhosis    This is a 61-year-old  female with cirrhosis probably due to MENDIOLA, who is here for her routine follow-up.  At her last visit, she had abd pain, with ascites form holding diuretics.  She was sent to the ER.  Currently she is on lasix 20 daily.      Patient reports chronic nausea x 3 months, bilat flak pain.  She has seen Dr. Micah Finch previously, he had recommended colonoscopy, which she dies not want to go through.       Patient denies fever, constipation, diarrhea, hematochezia, melena.      HCC surveillance:   Her latest ultrasound of the abdomen which was performed as part of HCC surveillance on 2/14/2019 showed cirrhosis, no focal lesion in the liver.  portal vein was patent flow in the appropriate direction.  At that time no ascites was mentioned on the ultrasound.     Last AFP was 2, on 2/14/19.      Past history at presentation in July 2016:  grade 1 varix on her EGD on December 16, 2015.      She also had anemia from frequent hemorrhoidal bleeds and at one time required a unit of packed red blood cells and iron infusions for severe anemia.  Previously 34 years ago when she was 6 months pregnant, she had her open cholecystectomy.      ROS:  Constitutional: No fevers, chills, weight changes, fatigue  ENT: No allergies, nosebleeds,   CV: No chest pain  Pulm: No cough, shortness of breath  Ophtho: No vision changes  GI/Liver: see HPI, bilat flank pain, does not report abd is swollen,     Derm: No rash, itching  Heme: No swollen glands, bruising  MSK: No joint pains, joint swelling  : No dysuria, hematuria, decrease in urine output  Endo: No hot or cold intolerance  Neuro: No confusion, disorientation, difficulty with sleep, memory, concentration, syncope, seizure  Psych: No anxiety,  depression    Medical History:  has a past medical history of Anemia, Cirrhosis, Colon polyp, COPD (chronic obstructive pulmonary disease), Diabetes mellitus, Diverticulosis, Gastric ulcer, Hypertension, Hypothyroid, and Sleep apnea.    Surgical History:  has a past surgical history that includes Cholecystectomy;  section; Hysterectomy; and Thyroidectomy.    Family History: family history includes Colon cancer in her sister; Colon polyps in her father; Endometrial cancer in her mother; Ovarian cancer in her mother..     Social History:  reports that she has never smoked. She does not have any smokeless tobacco history on file. She reports that she does not drink alcohol or use drugs.    Review of patient's allergies indicates:   Allergen Reactions    Tylox [oxycodone-acetaminophen]        Current Outpatient Medications   Medication Sig    albuterol (PROVENTIL) 5 mg/mL nebulizer solution Take 2.5 mg by nebulization every 6 (six) hours as needed for Wheezing.    bisacodyl (DULCOLAX) 5 mg EC tablet Take 5 mg by mouth daily as needed for Constipation.    calcium-vitamin D3 500 mg(1,250mg) -200 unit per tablet Take 1 tablet by mouth once daily.    carvedilol (COREG) 6.25 MG tablet TAKE ONE TABLET BY MOUTH TWICE DAILY WITH MEALS    docusate sodium (COLACE) 100 MG capsule Take 100 mg by mouth daily as needed for Constipation.    fenofibrate (TRICOR) 145 MG tablet Take 145 mg by mouth once daily.    furosemide (LASIX) 20 MG tablet Take 1 tablet (20 mg total) by mouth once daily.    gabapentin (NEURONTIN) 100 MG capsule Take 100 mg by mouth 3 (three) times daily.    glimepiride (AMARYL) 4 MG tablet Take 4 mg by mouth before breakfast.    hydrocodone-acetaminophen 7.5-325mg (NORCO) 7.5-325 mg per tablet Take 1 tablet by mouth every 6 (six) hours as needed for Pain.    levothyroxine (SYNTHROID) 150 MCG tablet Take 150 mcg by mouth once daily.    losartan (COZAAR) 100 MG tablet Take 100 mg by mouth once  "daily.    nateglinide (STARLIX) 120 MG tablet Take 120 mg by mouth 3 (three) times daily before meals.    omeprazole (PRILOSEC) 40 MG capsule Take 40 mg by mouth every morning.    pravastatin (PRAVACHOL) 40 MG tablet Take 40 mg by mouth once daily.    sitagliptan-metformin (JANUMET) 50-1,000 mg per tablet Take 1 tablet by mouth 2 (two) times daily with meals.    telmisartan (MICARDIS) 80 MG Tab Take 40 mg by mouth once daily.    topiramate 25 mg capsule Take 50 mg by mouth 2 (two) times daily.    traMADol (ULTRAM-ER) 100 MG Tb24 Take 100 mg by mouth once daily.    venlafaxine (EFFEXOR-XR) 150 MG Cp24 Take 75 mg by mouth once daily.    zolpidem (AMBIEN) 10 mg Tab Take 5 mg by mouth nightly as needed.     No current facility-administered medications for this visit.        Objective Findings:    Vital Signs:  BP (!) 155/81   Pulse 70   Resp 20   Ht 4' 11" (1.499 m)   Wt 93.4 kg (206 lb)   BMI 41.61 kg/m²   Body mass index is 41.61 kg/m².    Physical Exam:  General Appearance: Well appearing obese female in no acute distress  Head:   Normocephalic, without obvious abnormality  Eyes:    No scleral icterus, EOMI  ENT: Negative  Lungs:  no wheezes  Heart:  Regular rate and rhythm, S1, S2 normal   Abdomen: Abdomen is not distended and non-tender.    Extremities: no clubbing, cyanosis.  No edema in the lower extremities  Skin: No rash  Neurologic: CN II-XII intact, alert, oriented x 3. No asterixis      Labs:  As above    Imaging:   As above    Endoscopy:    Grade 1 varix.       Assessment:  1. Other cirrhosis of liver    Cirrhosis, most likely due to MENDIOLA, DM, poor control of blood glucose. mild portal HTN, based on grade 1 varix, normal plt ct.  splenomegaly however suggests significant portal hypertension.  CT scan of the abdomen did show some ascites in the past, but last u/s on 6/29/18 does not mention any perihepatic ascites.    HCC surveillance neg to date as of 2/14/19 with u/s and AFP showing no evid " of focal liver lesion or elev of AFP. .    Plan:  -  Patient advised to see Dr. Micah Finch for symptoms of chronic nausea, bilateral flank pains.    -  Labs     AFP, CMP, PT INR every 6 months, next in Aug  2019,  -  Low salt in the diet, avoid canned, bottled and processed foods  -  Continue carvedilol 6.25 mg BID, and Lasix 20 mg daily.   -  Patient advised to check her blood pressure daily and report if remains high.  -  Ultrasound of abdomen and AFP every 6 months, next due Aug 2019.  -  Avoid alcohol, smoking, sedatives and meds with codeine.  -  Endoscopy: per víctor [place]  -  Transplant option discussed, will evaluate when MELD >15.  -  Return in 1 year.       Follow up in about 1 year (around 5/23/2020).      Order summary:  No orders of the defined types were placed in this encounter.      Thank you so much for allowing me to participate in the care of Shelby Jordan MD

## 2020-01-31 ENCOUNTER — TELEPHONE (OUTPATIENT)
Dept: GASTROENTEROLOGY | Facility: CLINIC | Age: 65
End: 2020-01-31

## 2020-01-31 NOTE — TELEPHONE ENCOUNTER
----- Message from Jaida Feliciano sent at 1/31/2020 11:06 AM CST -----  Contact: referring office nurse  Please call above patient at [phone] need to schedule appointment faxing over information from referring doctors office thanks.

## 2020-02-19 ENCOUNTER — TELEPHONE (OUTPATIENT)
Dept: HEPATOLOGY | Facility: CLINIC | Age: 65
End: 2020-02-19

## 2020-02-19 NOTE — TELEPHONE ENCOUNTER
----- Message from Shayy Luther sent at 2/19/2020 11:13 AM CST -----  Contact: Pt [phone]  Pt needs orders for blood work and ultrasound   Fax to [facility] [phone] [place]

## 2020-02-28 ENCOUNTER — TELEPHONE (OUTPATIENT)
Dept: GASTROENTEROLOGY | Facility: CLINIC | Age: 65
End: 2020-02-28

## 2020-02-28 NOTE — TELEPHONE ENCOUNTER
MA contact pt to informed pt appt with Dr. Landers on 4/30 will be cancel left detail message to call to  r/s appt

## 2020-03-02 ENCOUNTER — TELEPHONE (OUTPATIENT)
Dept: GASTROENTEROLOGY | Facility: CLINIC | Age: 65
End: 2020-03-02

## 2020-03-02 NOTE — TELEPHONE ENCOUNTER
MA contact patient to inform pt appt 4/30 was r/s to 4/16 at 1 pm with Dr. Ramesh instead of Dr. Landers due to Dr. Landers being out on 4/30    Pt didn't answer left detail message to return call

## 2020-03-02 NOTE — TELEPHONE ENCOUNTER
----- Message from Bridget Benavidez sent at 3/2/2020  3:08 PM CST -----  Contact: pt [phone]  Pt states that 04/16 appt is not going to work for her and she needs other date.Please call

## 2020-03-03 ENCOUNTER — TELEPHONE (OUTPATIENT)
Dept: GASTROENTEROLOGY | Facility: CLINIC | Age: 65
End: 2020-03-03

## 2020-03-03 NOTE — TELEPHONE ENCOUNTER
MA informed pt that Dr. Landers won't be in clinic on 4/30 and appt was r/s to 4/16 instead. Offered pt several other appts with Dr. Landers . Pt denied appt besides 4/24/20 at 1:30 pm with Dr. Landers ([email or web address])slot informed pt this appt will be schedule on 4/17 for 4/24.    Pt verbalize understanding, confirmed appt and thank MA     Message routed to Margo

## 2020-03-03 NOTE — TELEPHONE ENCOUNTER
----- Message from Fang Emerson MA sent at 3/3/2020 10:13 AM CST -----  Contact: [phone]  Good morning,    I tried to contact pt and LM. So pt wants to see Dr. Landers. Does he have anything next month around the 16th?    ----- Message -----  From: Brenda Puente MA  Sent: 3/3/2020  10:05 AM CST  To: [facility] Staff    Pt received an appt slip by mail for an office visit on 4/16/202 that does not work for her. Also pt wants to know why is she not seeing  Dr Landers?    Please call to reschedule.     NO ACCESS    [phone] please call this number NOT her husbands number (her phone number hs been updated in Epic)

## 2020-03-05 ENCOUNTER — TELEPHONE (OUTPATIENT)
Dept: HEPATOLOGY | Facility: CLINIC | Age: 65
End: 2020-03-05

## 2020-03-05 LAB
AFP - TUMOR MARKER: 2
ALBUMIN: 3.2
ALP ISOS SERPL LEV INH-CCNC: 103 U/L
ALT: 13
ANION GAP SERPL CALC-SCNC: 6 MMOL/L
AST: 24
BILIRUBIN, TOTAL: 0.4
BUN BLD-MCNC: 9 MG/DL (ref 4–21)
CALCIUM SERPL-MCNC: 7.6 MG/DL
CARBON DIOXIDE, CO2: 24
CHLORIDE: 110
CREAT SERPL-MCNC: 0.8 MG/DL
EGFR IF AFRICAN AMERICAN: NORMAL
EST. GFR  (NON AFRICAN AMERICAN): NORMAL
GFR MDRD AF AMER: 86
GFR: 71
GLUCOSE: 134
HCT: 35.2
HGB: 10.8
INR PPP: 1.1 (ref 2–3)
MCH RBC QN AUTO: 27.8 PG
MCHC RBC AUTO-ENTMCNC: 30.7 G/DL
MCV RBC AUTO: 90.7 FL
PLATELET # BLD AUTO: 151 K/?L (ref 150–399)
POTASSIUM: 3.96
PROTEIN TOTAL: 7.7
PT: 12.4
RBC # BLD AUTO: 3.88 10*6/UL
RDW-CV: 15.9
RDW-SD: 52.4
SODIUM: 140
WBC: 7.06

## 2020-03-05 NOTE — TELEPHONE ENCOUNTER
----- Message from Mary Jordan MD sent at 3/5/2020  3:58 PM CST -----  Please inform patient results are OK.

## 2020-03-20 ENCOUNTER — TELEPHONE (OUTPATIENT)
Dept: GASTROENTEROLOGY | Facility: CLINIC | Age: 65
End: 2020-03-20

## 2020-03-20 NOTE — TELEPHONE ENCOUNTER
----- Message from Evelin Sanchez sent at 3/20/2020 11:33 AM CDT -----  Contact: pt: [phone]  Pt is returning a call from the clinic re appt       Please contact pt: [phone]

## 2020-03-20 NOTE — TELEPHONE ENCOUNTER
Spoke with pt regarding 04/16/2020 appointment. Pt stated she already cancelled it and spoke with Coby about scheduling a new appt on 04/24. I informed pt if anything changes before then she would be contacted. Pt verbalized understanding.

## 2020-03-30 ENCOUNTER — TELEPHONE (OUTPATIENT)
Dept: HEPATOLOGY | Facility: CLINIC | Age: 65
End: 2020-03-30

## 2020-03-30 NOTE — TELEPHONE ENCOUNTER
----- Message from Vane Armstrong sent at 3/30/2020 10:47 AM CDT -----  Contact: pt  Patient calling to schedule appt       Call Back : [phone]

## 2020-03-30 NOTE — TELEPHONE ENCOUNTER
MA called patient back, she want to make her appt in [place]. She accepted 5/28 mailed appt reminder to patient.

## 2020-04-17 ENCOUNTER — TELEPHONE (OUTPATIENT)
Dept: GASTROENTEROLOGY | Facility: CLINIC | Age: 65
End: 2020-04-17

## 2020-04-17 NOTE — TELEPHONE ENCOUNTER
MA contact pt to discuss appt with Dr. Landers on 4/24 appt will schedule as VV. Pt didn't answer left detail message to return call.

## 2020-04-27 ENCOUNTER — TELEPHONE (OUTPATIENT)
Dept: GASTROENTEROLOGY | Facility: CLINIC | Age: 65
End: 2020-04-27

## 2020-04-27 ENCOUNTER — OFFICE VISIT (OUTPATIENT)
Dept: GASTROENTEROLOGY | Facility: CLINIC | Age: 65
End: 2020-04-27
Payer: MEDICARE

## 2020-04-27 DIAGNOSIS — D64.9 ANEMIA, UNSPECIFIED TYPE: Primary | ICD-10-CM

## 2020-04-27 DIAGNOSIS — Z86.010 HISTORY OF COLON POLYPS: ICD-10-CM

## 2020-04-27 DIAGNOSIS — I85.00 VARICES OF ESOPHAGUS DETERMINED BY ENDOSCOPY: ICD-10-CM

## 2020-04-27 DIAGNOSIS — K74.60 HEPATIC CIRRHOSIS, UNSPECIFIED HEPATIC CIRRHOSIS TYPE, UNSPECIFIED WHETHER ASCITES PRESENT: ICD-10-CM

## 2020-04-27 PROCEDURE — 99204 PR OFFICE/OUTPT VISIT, NEW, LEVL IV, 45-59 MIN: ICD-10-PCS | Mod: S$PBB,GC,,

## 2020-04-27 PROCEDURE — 99204 OFFICE O/P NEW MOD 45 MIN: CPT | Mod: S$PBB,GC,,

## 2020-04-27 NOTE — TELEPHONE ENCOUNTER
MA contact pt regarding message below. Pt didn't answer left detail message to return call.     ----- Message from Evelin Sanchez sent at 4/27/2020  9:53 AM CDT -----  Contact: pt: [phone]  Pt is returning a call to the clinic       Please contact pt: [phone]

## 2020-04-27 NOTE — PROGRESS NOTES
[facility]  Audio Only Telehealth Visit     The patient location is: Home  The chief complaint leading to consultation is: anemia  Visit type: Virtual visit with audio only (telephone)     The reason for the audio only service rather than synchronous audio and video virtual visit was related to technical difficulties or patient preference/necessity.     Each patient to whom I provide medical services by telemedicine is:  (1) informed of the relationship between the physician and patient and the respective role of any other health care provider with respect to management of the patient; and (2) notified that they may decline to receive medical services by telemedicine and may withdraw from such care at any time. Patient verbally consented to receive this service via voice-only telephone call.      Reason for visit: Anemia  Referring Provider/PCP: Jae Cordova MD    History of Present Illness:  Shelby Lewis is a 64 y.o. female with a history of Cirrhosis (suspect MENDIOLA), small EV (on EGD in 2016 on BB ppx),  who is presenting for initial evaluation of anemia. This is a telephone visit.     Patient has had chronic anemia for many years, follows Hematology, has been receiving IV iron infusions q6 months. Patient was sent for GI evaluation of anemia. She has intermittent nausea at time when she eats, no vomiting, no dysphagia, no hematemesis, no black stools or BRBPR, denies weight loss. She has family history of colon cancer in her sister at age 63. Her last EGD was in 7/28/2016 which showed Grade I EV and 3 gastric ulcers in antrum. She was recommended PPI twice daily. Colonoscopy in 2016 showed two AVMS in cecum, one AVM in ascending colon, one polyp 1.5 cm removed, one polyp 0.7 cm removed, and one polyp 5 mm removed, and rectal varices were seen. . She takes omeprazole 40mg daily. She was recommended to go for a colonoscopy in the past but she did not follow up with it. Her most recent  labs show hb of 10.8 on 3/2/20. No iron studies done in the past.         Review of Systems:   Constitutional: no fever, chills or change in weight   Eyes: no visual changes   ENT: no sore throat or dysphagia  Respiratory: no cough or shortness of breath   Cardiovascular: no chest pain or palpitations   Gastrointestinal: as per HPI  Hematologic/Lymphatic: no easy bruising or lymphadenopathy   Musculoskeletal: no arthralgias or myalgias   Neurological: no change in mental status  Behavioral/Psych: no change in mood    Medical History:  Past Medical History:   Diagnosis Date    Anemia     Anxiety     Arthritis     Cirrhosis     Colon polyp     tubular adenoma    COPD (chronic obstructive pulmonary disease)     Depression     Diabetes mellitus     Diverticulosis     Gastric ulcer     Heart disease     Hypertension     Hypothyroid     Sleep apnea        Past Surgical History:   Procedure Laterality Date    CERVICAL SPINE SURGERY       SECTION      CHOLECYSTECTOMY      HYSTERECTOMY      THYROIDECTOMY         Family History   Problem Relation Age of Onset    Endometrial cancer Mother     Ovarian cancer Mother     Colon polyps Father     Colon cancer Sister        Social History     Socioeconomic History    Marital status:      Spouse name: Not on file    Number of children: Not on file    Years of education: Not on file    Highest education level: Not on file   Occupational History    Not on file   Social Needs    Financial resource strain: Not on file    Food insecurity:     Worry: Not on file     Inability: Not on file    Transportation needs:     Medical: Not on file     Non-medical: Not on file   Tobacco Use    Smoking status: Never Smoker   Substance and Sexual Activity    Alcohol use: No    Drug use: No    Sexual activity: Not on file   Lifestyle    Physical activity:     Days per week: Not on file     Minutes per session: Not on file    Stress: Not on file    Relationships    Social connections:     Talks on phone: Not on file     Gets together: Not on file     Attends Religion service: Not on file     Active member of club or organization: Not on file     Attends meetings of clubs or organizations: Not on file     Relationship status: Not on file   Other Topics Concern    Not on file   Social History Narrative    Not on file       Current Outpatient Medications on File Prior to Visit   Medication Sig Dispense Refill    albuterol (PROVENTIL) 5 mg/mL nebulizer solution Take 2.5 mg by nebulization every 6 (six) hours as needed for Wheezing.      bisacodyl (DULCOLAX) 5 mg EC tablet Take 5 mg by mouth daily as needed for Constipation.      calcium-vitamin D3 500 mg(1,250mg) -200 unit per tablet Take 1 tablet by mouth once daily.      carvedilol (COREG) 6.25 MG tablet TAKE ONE TABLET BY MOUTH TWICE DAILY WITH MEALS 60 tablet 11    docusate sodium (COLACE) 100 MG capsule Take 100 mg by mouth daily as needed for Constipation.      fenofibrate (TRICOR) 145 MG tablet Take 145 mg by mouth once daily.      furosemide (LASIX) 20 MG tablet Take 1 tablet (20 mg total) by mouth once daily. 30 tablet 11    gabapentin (NEURONTIN) 100 MG capsule Take 100 mg by mouth 3 (three) times daily.      glimepiride (AMARYL) 4 MG tablet Take 4 mg by mouth before breakfast.      hydrocodone-acetaminophen 7.5-325mg (NORCO) 7.5-325 mg per tablet Take 1 tablet by mouth every 6 (six) hours as needed for Pain.      levothyroxine (SYNTHROID) 150 MCG tablet Take 150 mcg by mouth once daily.      losartan (COZAAR) 100 MG tablet Take 100 mg by mouth once daily.      nateglinide (STARLIX) 120 MG tablet Take 120 mg by mouth 3 (three) times daily before meals.      omeprazole (PRILOSEC) 40 MG capsule Take 40 mg by mouth every morning.      pravastatin (PRAVACHOL) 40 MG tablet Take 40 mg by mouth once daily.      sitagliptan-metformin (JANUMET) 50-1,000 mg per tablet Take 1 tablet by mouth 2  (two) times daily with meals.      telmisartan (MICARDIS) 80 MG Tab Take 40 mg by mouth once daily.      topiramate 25 mg capsule Take 50 mg by mouth 2 (two) times daily.      traMADol (ULTRAM-ER) 100 MG Tb24 Take 100 mg by mouth once daily.      venlafaxine (EFFEXOR-XR) 150 MG Cp24 Take 75 mg by mouth once daily.      zolpidem (AMBIEN) 10 mg Tab Take 5 mg by mouth nightly as needed.       No current facility-administered medications on file prior to visit.        Review of patient's allergies indicates:   Allergen Reactions    Tylox [oxycodone-acetaminophen]          Laboratory:  Lab Results   Component Value Date     03/02/2020    K 3.96 03/02/2020     03/02/2020    CO2 24 03/02/2020    BUN 9 03/02/2020    CREATININE 0.8 03/02/2020    CALCIUM 7.6 03/02/2020    ANIONGAP 6 03/02/2020    EGFRNONAA  03/02/2020      Comment:      >60       Lab Results   Component Value Date    ALT 13 03/02/2020    AST 23 02/14/2019    ALKPHOS 89 06/29/2018    ALKPHOS 89 06/29/2018    BILITOT 0.4 05/04/2017       Lab Results   Component Value Date    WBC 7.06 03/02/2020    HGB 10.8 03/02/2020    HCT 36 02/14/2019    MCV 73.0 (A) 05/04/2017     03/02/2020       Microbiology:  No Pertinent Microbiology    Imaging:  -reviewed    Assessment:  Shelby Lewis is a 64 y.o. female who is presenting for initial evaluation of anemia. She has history of MENDIOLA cirrhosis, last EGD in 2016 showed gastric ulcers and grade I EV, colonoscopy in 2016 showed 3 polyps (1 of which was >1cm) and colonic AVMS. She is due for repeat EGD (for variceal surveillance and anemia) and colonoscopy (for polyp surveillance and anemia). She has chronic anemia requiring IV iron infusions, with no overt GI bleed. She takes PPI once daily, denies use of NSAIDS, Aspirin, or anticoagulants.       Plan:  1. EGD/Colonoscopy  2. Iron studies at time of endoscopy.   3. CRC Screening: Due   Follow up in about 6 months (around 10/27/2020).    Ean QUIROS  MD Gadiel  Gastroenterology Fellow  Phone: [phone]    No orders of the defined types were placed in this encounter.         This service was not originating from a related E/M service provided within the previous 7 days nor will  to an E/M service or procedure within the next 24 hours or my soonest available appointment.  Prevailing standard of care was able to be met in this audio-only visit.

## 2020-05-05 ENCOUNTER — TELEPHONE (OUTPATIENT)
Dept: HEPATOLOGY | Facility: CLINIC | Age: 65
End: 2020-05-05

## 2020-05-05 NOTE — TELEPHONE ENCOUNTER
MA called patient to inform her that MD will not be in satellite clinic, we have to cancell her 5/28 appt or convert it to video visit.     She is unable to reached left her VM to please give us a callback.

## 2020-05-06 ENCOUNTER — TELEPHONE (OUTPATIENT)
Dept: HEPATOLOGY | Facility: CLINIC | Age: 65
End: 2020-05-06

## 2020-05-06 NOTE — TELEPHONE ENCOUNTER
----- Message from Jaida Cárdenas RN sent at 5/5/2020  1:14 PM CDT -----  Contact: pt      ----- Message -----  From: Donnell Carolina  Sent: 5/5/2020  11:54 AM CDT  To: [facility] Staff    Calling to speak with Za Badillo    Call back: [phone]

## 2020-05-06 NOTE — TELEPHONE ENCOUNTER
MA called patient back, inform her that Dr. Jordan will not be in [facility] for now. Asked patient to convert the appt to VIDEO VISIT but she said she does not have a smart phone she is wiling to wait till Dr. Jordan decide to go back to [place].

## 2020-05-11 ENCOUNTER — TELEPHONE (OUTPATIENT)
Dept: HEPATOLOGY | Facility: CLINIC | Age: 65
End: 2020-05-11

## 2020-05-11 NOTE — TELEPHONE ENCOUNTER
Called patient to inform her that we have to reschedule her 5/28 appt. She is unable to reached left her VM to please give us a callback.

## 2020-05-13 ENCOUNTER — TELEPHONE (OUTPATIENT)
Dept: HEPATOLOGY | Facility: CLINIC | Age: 65
End: 2020-05-13

## 2020-05-13 NOTE — TELEPHONE ENCOUNTER
MA called patient again to inform her that we have to cancel her 5/28 appt due to MD will not be in [place]. We can do video visit or she can come here at main campus. She is unable to reached left her VM again to please give us a callback.

## 2020-05-19 ENCOUNTER — TELEPHONE (OUTPATIENT)
Dept: HEPATOLOGY | Facility: CLINIC | Age: 65
End: 2020-05-19

## 2020-05-19 NOTE — TELEPHONE ENCOUNTER
MA called patient back she want to reschedule her appt but inform patient that Dr. Jordan is no going to any of her satellite clinic for now. Offer her main campus but patient stated she is willing to wait whenever Dr. Jordan decide to go back to [place]. Add patient on the list.

## 2020-05-19 NOTE — TELEPHONE ENCOUNTER
----- Message from Za Badillo MA sent at 5/18/2020  4:06 PM CDT -----  Contact: pt      ----- Message -----  From: Alisa GARCÍA August  Sent: 5/18/2020   3:30 PM CDT  To: [facility] Staff    Reason: Returning call    Communication: [phone]

## 2020-08-26 ENCOUNTER — TELEPHONE (OUTPATIENT)
Dept: HEPATOLOGY | Facility: CLINIC | Age: 65
End: 2020-08-26

## 2020-08-26 NOTE — TELEPHONE ENCOUNTER
Called PATIENT to schedule her follow up visit to see Dr. Jordan. She is unable to reached left her VM to please give us a callback.

## 2020-10-21 ENCOUNTER — TELEPHONE (OUTPATIENT)
Dept: HEPATOLOGY | Facility: CLINIC | Age: 65
End: 2020-10-21

## 2020-10-21 NOTE — TELEPHONE ENCOUNTER
CALLED patient to schedule her follow up visit she is unable to come here in [place] and she is unable to do video visit. She will continue to see her PCP and do her routine test.     Mailed external order for her labs and US. (AFP, CMP, CBC, PT-INR and US ABD)
